# Patient Record
Sex: MALE | Race: WHITE | NOT HISPANIC OR LATINO | Employment: FULL TIME | URBAN - METROPOLITAN AREA
[De-identification: names, ages, dates, MRNs, and addresses within clinical notes are randomized per-mention and may not be internally consistent; named-entity substitution may affect disease eponyms.]

---

## 2024-10-04 ENCOUNTER — OFFICE VISIT (OUTPATIENT)
Dept: INTERNAL MEDICINE | Age: 69
End: 2024-10-04
Payer: COMMERCIAL

## 2024-10-04 VITALS
OXYGEN SATURATION: 94 % | WEIGHT: 195 LBS | DIASTOLIC BLOOD PRESSURE: 70 MMHG | TEMPERATURE: 97.5 F | HEIGHT: 72 IN | HEART RATE: 90 BPM | BODY MASS INDEX: 26.41 KG/M2 | SYSTOLIC BLOOD PRESSURE: 136 MMHG

## 2024-10-04 DIAGNOSIS — Z11.59 NEED FOR HEPATITIS C SCREENING TEST: ICD-10-CM

## 2024-10-04 DIAGNOSIS — Z12.5 SCREENING FOR MALIGNANT NEOPLASM OF PROSTATE: ICD-10-CM

## 2024-10-04 DIAGNOSIS — E78.5 HYPERLIPIDEMIA, UNSPECIFIED HYPERLIPIDEMIA TYPE: Primary | Chronic | ICD-10-CM

## 2024-10-04 DIAGNOSIS — Z00.00 ANNUAL PHYSICAL EXAM: ICD-10-CM

## 2024-10-04 DIAGNOSIS — F41.0 PANIC ATTACK: ICD-10-CM

## 2024-10-04 DIAGNOSIS — F41.9 ANXIETY: Chronic | ICD-10-CM

## 2024-10-04 PROBLEM — H93.19 TINNITUS: Status: ACTIVE | Noted: 2024-10-04

## 2024-10-04 RX ORDER — BUSPIRONE HYDROCHLORIDE 10 MG/1
10 TABLET ORAL 2 TIMES DAILY
Qty: 90 TABLET | Refills: 1 | Status: SHIPPED | OUTPATIENT
Start: 2024-10-04

## 2024-10-04 RX ORDER — ATORVASTATIN CALCIUM 20 MG/1
20 TABLET, FILM COATED ORAL DAILY
COMMUNITY
End: 2024-10-04 | Stop reason: SDUPTHER

## 2024-10-04 RX ORDER — ATORVASTATIN CALCIUM 20 MG/1
20 TABLET, FILM COATED ORAL DAILY
Qty: 90 TABLET | Refills: 1 | Status: SHIPPED | OUTPATIENT
Start: 2024-10-04

## 2024-10-04 RX ORDER — BUSPIRONE HYDROCHLORIDE 10 MG/1
10 TABLET ORAL 2 TIMES DAILY
COMMUNITY
Start: 2024-07-15 | End: 2024-10-04 | Stop reason: SDUPTHER

## 2024-10-04 NOTE — PROGRESS NOTES
Chief Complaint  Establish Care (The patient is coming in to [facility]. The patient is needing medication refills. )    Subjective      History of Present Illness  The patient is a 69-year-old male who presents to [facility].    He was previously under the care of Keeley Remy, but due to her office closure, he has been without his statin medication for hyperlipidemia for about a week. His cholesterol levels have been well-managed with statin medication. His last blood work, conducted six months ago, showed normal results except for a low protein level. As a vegetarian, he has since incorporated a daily protein drink into his diet. He also had slightly elevated triglycerides and was advised to increase his hydration, which he has been doing. He manages his high cholesterol with regular blood work every six months, now annually, and has been advised to maintain a high-protein diet and stay hydrated.    He has a history of anxiety and mild panic attacks, for which he takes buspirone 10 mg once daily, increasing to twice daily during more anxious periods.    He underwent a physical examination and colonoscopy in 2024, which revealed one polyp, leading to a recommendation for another colonoscopy in five years. His bone density scan was normal. His PSA test was also normal.    He has been referred to a dermatologist for a dry patch on his face, which has not changed over time. He has no history of skin cancer but has an elevated mole on his back.    He has tinnitus, mostly in one ear, and mild hearing loss, for which he has seen an ENT specialist. He also has geographic tongue.    He has received two rounds of shingles vaccines and several COVID-19 vaccines, but not the latest one. He believes he may have received the pneumonia vaccine and is unsure about the tetanus vaccine.    He has no history of heart problems or shortness of breath on exertion. He enjoys playing tennis and does not smoke.    FAMILY  "HISTORY  Two of his brothers have had heart attacks, one of them has had a stroke. His father had a stroke at maybe late 30s. His mother was 85, she did not have any problem with strokes, but she  of a massive hemorrhage. His brother's stroke was different from the kind his father had. His father had the worst kind of stroke. He had to actually retire from it. His father was a long-term smoker and he got emphysema.       Past Medical History:   Diagnosis Date    Allergic     Pollen    Anxiety     Geographic tongue 06/15/2022    Headache     HL (hearing loss)     Tinnitus    Hyperlipidemia     On meds        History reviewed. No pertinent surgical history.     Social History     Tobacco Use   Smoking Status Never    Passive exposure: Never   Smokeless Tobacco Never        Patient Care Team:  Nicky Xavier APRN as PCP - General (Nurse Practitioner)    No Known Allergies       Current Outpatient Medications:     atorvastatin (LIPITOR) 20 MG tablet, Take 1 tablet by mouth Daily., Disp: 90 tablet, Rfl: 1    busPIRone (BUSPAR) 10 MG tablet, Take 1 tablet by mouth 2 (Two) Times a Day., Disp: 90 tablet, Rfl: 1    Objective     Vitals:    10/04/24 1120   BP: 136/70   BP Location: Right arm   Patient Position: Sitting   Cuff Size: Adult   Pulse: 90   Temp: 97.5 °F (36.4 °C)   TempSrc: Temporal   SpO2: 94%   Weight: 88.5 kg (195 lb)   Height: 182.9 cm (72\")        Wt Readings from Last 3 Encounters:   10/04/24 88.5 kg (195 lb)        BP Readings from Last 3 Encounters:   10/04/24 136/70          Physical Exam  Vitals reviewed.   Constitutional:       General: He is not in acute distress.  HENT:      Head: Normocephalic and atraumatic.   Cardiovascular:      Rate and Rhythm: Normal rate and regular rhythm.   Pulmonary:      Effort: Pulmonary effort is normal.      Breath sounds: Normal breath sounds. No wheezing, rhonchi or rales.   Musculoskeletal:      Right lower leg: No edema.      Left lower leg: No " edema.   Skin:     General: Skin is warm and dry.   Neurological:      General: No focal deficit present.      Mental Status: He is alert.   Psychiatric:         Thought Content: Thought content normal.                Result Review   The following data was reviewed by: CARLOTTA Hebert on 10/04/2024:  []  Tests & Results  []  Hospitalization/Emergency Department/Urgent Care  [x]  Internal/External Consultant Notes       Assessment and Plan     Diagnoses and all orders for this visit:    1. Hyperlipidemia, unspecified hyperlipidemia type (Primary)    2. Anxiety    3. Panic attack    4. Need for hepatitis C screening test  -     Hepatitis C Antibody; Future    5. Screening for malignant neoplasm of prostate  -     PSA Screen; Future    6. Annual physical exam  -     CBC & Differential; Future  -     Comprehensive Metabolic Panel; Future  -     Lipid Panel; Future  -     TSH Rfx On Abnormal To Free T4; Future    Other orders  -     atorvastatin (LIPITOR) 20 MG tablet; Take 1 tablet by mouth Daily.  Dispense: 90 tablet; Refill: 1  -     busPIRone (BUSPAR) 10 MG tablet; Take 1 tablet by mouth 2 (Two) Times a Day.  Dispense: 90 tablet; Refill: 1         Assessment & Plan  1. Hyperlipidemia.  He has been taking atorvastatin for cholesterol management. The last pill was taken on Monday night, and a refill has been sent to [facility]. He reports that his cholesterol levels have been fine since starting statins. Blood work, including cholesterol levels, has been ordered for 6 months from now.    2. Anxiety.  He takes buspirone 10 mg once a day for anxiety and mild panic attacks, with an additional dose if experiencing a panicky day. A refill has been sent to [facility] pharmacy.    3. Tinnitus.  He reports having tinnitus, which is controlled and does not bother him anymore. He also has mild hearing loss but does not require a hearing aid as per his ENT specialist.    4. Geographic Tongue.  He reports having a  geographic tongue, but no specific treatment is required.    5. Skin Lesion.  He has a dry area on his face, which may be precancerous. He has been referred to a dermatologist, but the appointment is backlogged for a few months.    6. Health Maintenance.  He had a physical and a colonoscopy in 2024, with one polyp found, recommending another colonoscopy in 5 years. He has had the Shingrix vaccine and multiple rounds of the COVID-19 vaccine but not the latest one. He is advised to get a flu shot and consider a pneumococcal 20 vaccine. A PSA blood test has been ordered for next year.    Follow-up  Return in 6 months for a physical examination.       BMI is >= 25 and <30. (Overweight) The following options were offered after discussion;: exercise counseling/recommendations and nutrition counseling/recommendations       Patient was given instructions and counseling regarding his condition or for health maintenance advice. Please see specific information pulled into the AVS if appropriate.     Follow Up   Return in about 6 months (around 4/4/2025) for Annual physical.    Patient or patient representative verbalized consent for the use of Ambient Listening during the visit with  CARLOTTA Hebert for chart documentation. 10/4/2024  12:05 EDT    CARLOTTA Hebert

## 2025-02-18 ENCOUNTER — OFFICE VISIT (OUTPATIENT)
Dept: INTERNAL MEDICINE | Age: 70
End: 2025-02-18
Payer: COMMERCIAL

## 2025-02-18 VITALS
WEIGHT: 204.2 LBS | HEART RATE: 96 BPM | RESPIRATION RATE: 16 BRPM | BODY MASS INDEX: 27.66 KG/M2 | DIASTOLIC BLOOD PRESSURE: 80 MMHG | TEMPERATURE: 98.6 F | SYSTOLIC BLOOD PRESSURE: 138 MMHG | OXYGEN SATURATION: 95 % | HEIGHT: 72 IN

## 2025-02-18 DIAGNOSIS — H61.22 IMPACTED CERUMEN OF LEFT EAR: Primary | ICD-10-CM

## 2025-02-18 DIAGNOSIS — H66.92 LEFT OTITIS MEDIA, UNSPECIFIED OTITIS MEDIA TYPE: ICD-10-CM

## 2025-02-18 PROCEDURE — 99213 OFFICE O/P EST LOW 20 MIN: CPT | Performed by: NURSE PRACTITIONER

## 2025-02-18 PROCEDURE — 69210 REMOVE IMPACTED EAR WAX UNI: CPT | Performed by: NURSE PRACTITIONER

## 2025-02-18 RX ORDER — ACETIC ACID 20.65 MG/ML
3 SOLUTION AURICULAR (OTIC) 3 TIMES DAILY
Qty: 15 ML | Refills: 0 | Status: SHIPPED | OUTPATIENT
Start: 2025-02-18 | End: 2025-02-25

## 2025-02-18 NOTE — PROGRESS NOTES
Chief Complaint  Ear Fullness (Was diagnosed with an ear infection back in December. Took a steroid, Abx and eardrops. Since then, better but the fullness has not completely gone away. Used Debrox recently. )    Subjective      History of Present Illness  The patient is a 69-year-old male who presents for evaluation of left ear clogging.    He reports persistent clogging in his left ear, which he attributes to an ear infection diagnosed on 12/27/2024. At that time, he experienced irritation, sleep disturbances, and otorrhea. He was treated with antibiotics and steroid eardrops, which initially alleviated his symptoms. However, during cerumen removal, his inner ear was inadvertently lacerated, resulting in bleeding. The healthcare provider did not believe the eardrum was affected. He has been using Debrox for cerumen management. On Sunday, he noticed a temporary improvement in his hearing, but by Monday, the clogging sensation returned. A similar episode occurred 3 years ago, leading to hearing loss in the same ear, which was resolved after ear irrigation at an urgent care facility. He reports no current ear irritation or pain but notes a sensation of fullness and reduced hearing. He also reports no sore throat or cough. He occasionally uses Q-tips for ear cleaning. He recalls experiencing intermittent ear clicking a few weeks ago, which has since subsided. He also has tinnitus, but it does not bother him anymore.    MEDICATIONS  Current: Debrox    IMMUNIZATIONS  He received the influenza vaccine.       Past Medical History:   Diagnosis Date    Allergic 1990    Pollen    Anxiety 2022    Geographic tongue 06/15/2022    Headache 1962    HL (hearing loss)     Tinnitus    Hyperlipidemia 2021    On meds        History reviewed. No pertinent surgical history.     Social History     Tobacco Use   Smoking Status Never    Passive exposure: Never   Smokeless Tobacco Never        Patient Care Team:  Nicky Xavier APRN as  "PCP - General (Nurse Practitioner)    Allergies   Allergen Reactions    Sulfa Antibiotics GI Intolerance     Only happened once.           Current Outpatient Medications:     atorvastatin (LIPITOR) 20 MG tablet, Take 1 tablet by mouth Daily., Disp: 90 tablet, Rfl: 1    busPIRone (BUSPAR) 10 MG tablet, Take 1 tablet by mouth 2 (Two) Times a Day., Disp: 90 tablet, Rfl: 1    acetic acid (VOSOL) 2 % otic solution, Administer 3 drops into the left ear 3 (Three) Times a Day for 7 days., Disp: 15 mL, Rfl: 0    Objective     Vitals:    02/18/25 1148   BP: 138/80   BP Location: Left arm   Patient Position: Sitting   Cuff Size: Large Adult   Pulse: 96   Resp: 16   Temp: 98.6 °F (37 °C)   TempSrc: Temporal   SpO2: 95%   Weight: 92.6 kg (204 lb 3.2 oz)   Height: 182.9 cm (72\")        Wt Readings from Last 3 Encounters:   02/18/25 92.6 kg (204 lb 3.2 oz)   10/04/24 88.5 kg (195 lb)        BP Readings from Last 3 Encounters:   02/18/25 138/80   10/04/24 136/70          Physical Exam  Vitals reviewed.   Constitutional:       General: He is not in acute distress.  HENT:      Head: Normocephalic and atraumatic.      Right Ear: Tympanic membrane and ear canal normal.      Left Ear: There is impacted cerumen.   Pulmonary:      Effort: Pulmonary effort is normal.   Neurological:      General: No focal deficit present.      Mental Status: He is alert.   Psychiatric:         Thought Content: Thought content normal.           Ear Cerumen Removal    Date/Time: 2/18/2025 12:06 PM    Performed by: Nicky Xavier APRN  Authorized by: Nicky Xavier APRN  Location details: left ear and right ear  Patient tolerance: patient tolerated the procedure well with no immediate complications  Comments: Large amounts of cerumen removed from both ears.  Left ear impaction removed.  Left ear TM with erythema and white exudate and canal with erythema.  Patient reports no pain, improved hearing.  Procedure type: instrumentation, irrigation "         Result Review   The following data was reviewed by: CARLOTTA Hebert on 02/18/2025:  []  Tests & Results  [x]  Hospitalization/Emergency Department/Urgent Care  []  Internal/External Consultant Notes       Assessment and Plan     Diagnoses and all orders for this visit:    1. Impacted cerumen of left ear (Primary)  -     Ear Cerumen Removal    2. Left otitis media, unspecified otitis media type    Other orders  -     acetic acid (VOSOL) 2 % otic solution; Administer 3 drops into the left ear 3 (Three) Times a Day for 7 days.  Dispense: 15 mL; Refill: 0         Assessment & Plan  1. Cerumen impaction, left ear.  The patient's symptoms of ear fullness and reduced hearing are likely due to cerumen impaction, which is observed to be lodged against the tympanic membrane. The ear canal appears erythematous, possibly due to recent manipulation, but there is no associated pain. He has been advised against the use of Q-tips for cerumen removal. Aural irrigation will be performed today to remove the impacted cerumen.    2.  Left otitis media.  Post cerumen irrigation of left ear reveals erythematous TM with white exudate and erythematous canal.  Patient denies pain.  Treat with acetic acid 2% otic solution 3 drops per left ear 3 times a day leaving ear upright for at least 5 minutes.  Use eardrops for 7 days.  Return in 2 weeks for reexamination.  Patient reports hearing improved post irrigation.    Patient was given instructions and counseling regarding his condition or for health maintenance advice. Please see specific information pulled into the AVS if appropriate.     Follow Up   Return in 2 weeks (on 3/4/2025) for Next scheduled follow up, or if symptoms worsen or fail to improve.    Patient or patient representative verbalized consent for the use of Ambient Listening during the visit with  CARLOTTA Hebert for chart documentation. 2/18/2025  13:34 EST    CARLOTTA Hebert

## 2025-03-04 ENCOUNTER — OFFICE VISIT (OUTPATIENT)
Dept: INTERNAL MEDICINE | Age: 70
End: 2025-03-04
Payer: COMMERCIAL

## 2025-03-04 VITALS
WEIGHT: 200.8 LBS | HEIGHT: 72 IN | DIASTOLIC BLOOD PRESSURE: 80 MMHG | OXYGEN SATURATION: 98 % | HEART RATE: 78 BPM | TEMPERATURE: 98.6 F | SYSTOLIC BLOOD PRESSURE: 115 MMHG | BODY MASS INDEX: 27.2 KG/M2

## 2025-03-04 DIAGNOSIS — H73.92 TYMPANIC MEMBRANE DISORDER, LEFT: Primary | ICD-10-CM

## 2025-03-04 PROCEDURE — 99213 OFFICE O/P EST LOW 20 MIN: CPT | Performed by: NURSE PRACTITIONER

## 2025-03-04 RX ORDER — ACETIC ACID 20.65 MG/ML
SOLUTION AURICULAR (OTIC)
COMMUNITY
Start: 2025-02-18

## 2025-03-04 NOTE — PROGRESS NOTES
Chief Complaint  Earache (The patient is coming in for a 3 week follow up on left ear pain. The patient states his left ear feel fine. )    Subjective      History of Present Illness  The patient is a 69-year-old male who presents for evaluation of his left ear.    He reports no current issues with his right ear but expresses concern about his left ear, which he perceives as abnormal. He is not experiencing any pain in the left ear. He has been using prescribed eardrops as part of his treatment regimen. He recalls a previous visit to an ENT specialist in [place] a few years ago when he was initially diagnosed with tinnitus. During that consultation, he was informed of mild hearing loss. However, he notes that his hearing in the left ear has since returned to its baseline level, comparable to his right ear.       Past Medical History:   Diagnosis Date    Allergic 1990    Pollen    Anxiety 2022    Geographic tongue 06/15/2022    Headache 1962    HL (hearing loss)     Tinnitus    Hyperlipidemia 2021    On meds        History reviewed. No pertinent surgical history.     Social History     Tobacco Use   Smoking Status Never    Passive exposure: Never   Smokeless Tobacco Never        Patient Care Team:  Nicky Xavier APRN as PCP - General (Nurse Practitioner)    Allergies   Allergen Reactions    Sulfa Antibiotics GI Intolerance     Only happened once.           Current Outpatient Medications:     acetic acid (VOSOL) 2 % otic solution, Administer  into the left ear., Disp: , Rfl:     atorvastatin (LIPITOR) 20 MG tablet, Take 1 tablet by mouth Daily., Disp: 90 tablet, Rfl: 1    busPIRone (BUSPAR) 10 MG tablet, Take 1 tablet by mouth 2 (Two) Times a Day., Disp: 90 tablet, Rfl: 1    Objective     Vitals:    03/04/25 1313   BP: 115/80   BP Location: Left arm   Patient Position: Sitting   Cuff Size: Large Adult   Pulse: 78   Temp: 98.6 °F (37 °C)   TempSrc: Temporal   SpO2: 98%   Weight: 91.1 kg (200 lb 12.8 oz)  "  Height: 182.9 cm (72\")        Wt Readings from Last 3 Encounters:   03/04/25 91.1 kg (200 lb 12.8 oz)   02/18/25 92.6 kg (204 lb 3.2 oz)   10/04/24 88.5 kg (195 lb)        BP Readings from Last 3 Encounters:   03/04/25 115/80   02/18/25 138/80   10/04/24 136/70          Physical Exam  Vitals reviewed.   Constitutional:       General: He is not in acute distress.  HENT:      Right Ear: Tympanic membrane and ear canal normal.      Left Ear: Ear canal normal.      Ears:      Comments: Left TM appears abnormal with good light reflex.  Pulmonary:      Effort: Pulmonary effort is normal.   Neurological:      General: No focal deficit present.      Mental Status: He is alert.   Psychiatric:         Thought Content: Thought content normal.                Result Review   The following data was reviewed by: CARLOTTA Hebert on 03/04/2025:  [x]  Tests & Results  []  Hospitalization/Emergency Department/Urgent Care  []  Internal/External Consultant Notes       Assessment and Plan     Diagnoses and all orders for this visit:    1. Tympanic membrane disorder, left (Primary)         Assessment & Plan  1. Abnormality in the left ear.  The patient's left ear does not exhibit signs of infection or redness, but there is an abnormal appearance that could be due to wax or an irregularity in the eardrum. He reports no pain and has been using eardrops as previously prescribed. Despite the use of eardrops and a previous irrigation, the abnormality persists. A referral to an ENT specialist is recommended for further evaluation and potential treatment, as they have more advanced equipment to assess and possibly remove the abnormality.     Patient was given instructions and counseling regarding his condition or for health maintenance advice. Please see specific information pulled into the AVS if appropriate.     Follow Up   Return for Next scheduled follow up.    Patient or patient representative verbalized consent for the use of " Ambient Listening during the visit with  CARLOTTA Hebert for chart documentation. 3/4/2025  13:28 EST    CARLOTTA Hebert

## 2025-03-31 RX ORDER — ATORVASTATIN CALCIUM 20 MG/1
20 TABLET, FILM COATED ORAL DAILY
Qty: 90 TABLET | Refills: 1 | Status: SHIPPED | OUTPATIENT
Start: 2025-03-31 | End: 2025-04-03 | Stop reason: SDUPTHER

## 2025-04-03 RX ORDER — ATORVASTATIN CALCIUM 20 MG/1
20 TABLET, FILM COATED ORAL DAILY
Qty: 90 TABLET | Refills: 1 | Status: SHIPPED | OUTPATIENT
Start: 2025-04-03

## 2025-04-07 NOTE — PROGRESS NOTES
Chief Complaint  Annual Exam (69 year old male here today for his annual physical. Needs to complete labs)    Subjective      History of Present Illness  The patient is a 69-year-old male who presents for an annual wellness exam and follow-up of hyperlipidemia and anxiety.    He has not undergone any recent blood work but has been fasting since 2 AM in anticipation of today's visit. He is uncertain about his pneumococcal vaccination status and will verify with [facility]. He has consented to receive a tetanus vaccine during this visit. He reports no pain. He has consulted an otolaryngologist who found no abnormalities. He experienced a minor episode of greenish discharge from his ear, which was attributed to cerumen impaction. This is the second instance of significant cerumen accumulation in his ear over the past 3 years, with the first episode being associated with an ear infection. He also reports tinnitus and geographic tongue, both of which are relatively harmless. He assumes tinnitus is age-related. He has gotten used to geographic tongue.    He continues his regimen of atorvastatin 20 mg daily for cholesterol management.    He is currently on buspirone 10 mg once daily for anxiety, a dosage that was initially recommended by his previous nurse practitioner. He requires a refill of this medication as he only has approximately 10 tablets remaining.    He recently visited a dermatologist who identified a red spot on his back, which was diagnosed as basal cell carcinoma. The lesion was treated with cryotherapy, and he is scheduled for a follow-up visit in approximately one month. He also had a mole and another unspecified skin condition evaluated, both of which were deemed non-problematic.    MEDICATIONS  atorvastatin, buspirone    IMMUNIZATIONS  He is uncertain about his pneumococcal vaccination status and will verify with [employer].       Past Medical History:   Diagnosis Date    Allergic 1990    Pollen    Anxiety 2022  "   Basal cell carcinoma (BCC) 04/08/2025    Geographic tongue 06/15/2022    Headache 1962    HL (hearing loss)     Tinnitus    Hyperlipidemia 2021    On meds        History reviewed. No pertinent surgical history.     Social History     Tobacco Use   Smoking Status Never    Passive exposure: Never   Smokeless Tobacco Never        Patient Care Team:  Nicky Xavier APRN as PCP - General (Nurse Practitioner)    Allergies   Allergen Reactions    Sulfa Antibiotics GI Intolerance     Only happened once.           Current Outpatient Medications:     atorvastatin (LIPITOR) 20 MG tablet, Take 1 tablet by mouth Daily., Disp: 90 tablet, Rfl: 1    busPIRone (BUSPAR) 10 MG tablet, Take 1 tablet by mouth Daily., Disp: 90 tablet, Rfl: 3    Objective     Vitals:    04/08/25 1306   BP: 148/76   BP Location: Left arm   Patient Position: Sitting   Cuff Size: Large Adult   Pulse: 81   Resp: 18   Temp: 98.6 °F (37 °C)   TempSrc: Temporal   SpO2: 94%   Weight: 90.6 kg (199 lb 12.8 oz)   Height: 182.9 cm (72.01\")        Wt Readings from Last 3 Encounters:   04/08/25 90.6 kg (199 lb 12.8 oz)   03/04/25 91.1 kg (200 lb 12.8 oz)   02/18/25 92.6 kg (204 lb 3.2 oz)        BP Readings from Last 3 Encounters:   04/08/25 148/76   03/04/25 115/80   02/18/25 138/80          Physical Exam  Vitals reviewed.   Constitutional:       General: He is not in acute distress.  HENT:      Head: Normocephalic and atraumatic.      Right Ear: Tympanic membrane and ear canal normal.      Left Ear: Tympanic membrane and ear canal normal.   Eyes:      Conjunctiva/sclera: Conjunctivae normal.   Cardiovascular:      Rate and Rhythm: Normal rate and regular rhythm.      Heart sounds: Normal heart sounds.   Pulmonary:      Effort: Pulmonary effort is normal.      Breath sounds: Normal breath sounds. No wheezing, rhonchi or rales.   Abdominal:      General: There is no distension.      Palpations: Abdomen is soft. There is no mass.      Tenderness: There is no " abdominal tenderness.   Musculoskeletal:      Right lower leg: No edema.      Left lower leg: No edema.   Lymphadenopathy:      Cervical: No cervical adenopathy.   Skin:     General: Skin is warm and dry.   Neurological:      General: No focal deficit present.      Mental Status: He is alert.   Psychiatric:         Mood and Affect: Mood normal.         Thought Content: Thought content normal.                Result Review   The following data was reviewed by: CARLOTTA Hebert on 04/04/2025:  [x]  Tests & Results  []  Hospitalization/Emergency Department/Urgent Care  []  Internal/External Consultant Notes       Assessment and Plan     Diagnoses and all orders for this visit:    1. Annual physical exam (Primary)  -     TSH Rfx On Abnormal To Free T4  -     Lipid Panel  -     Comprehensive Metabolic Panel  -     CBC & Differential    2. Hyperlipidemia, unspecified hyperlipidemia type    3. Anxiety    4. Basal cell carcinoma (BCC), unspecified site    5. Need for Tdap vaccination  -     Tdap Vaccine Greater Than or Equal To 8yo IM    6. Need for hepatitis C screening test  -     Hepatitis C Antibody    7. Screening for malignant neoplasm of prostate  -     PSA Screen    Other orders  -     busPIRone (BUSPAR) 10 MG tablet; Take 1 tablet by mouth Daily.  Dispense: 90 tablet; Refill: 3           Assessment & Plan  1. Health maintenance.  He is uncertain about his pneumococcal vaccination status and will verify with [employer]. He has consented to receive a tetanus vaccine during this visit. He reports tinnitus and geographic tongue, both of which are relatively harmless. He assumes tinnitus is age-related. He has gotten used to geographic tongue. A tetanus vaccine will be administered today. He is advised to receive the pneumonia vaccine concurrently with the influenza vaccine in the fall.    2. Hyperlipidemia.  He continues his regimen of atorvastatin 20 mg daily for cholesterol management. A cholesterol panel will be  ordered to monitor his lipid levels.    3. Anxiety.  He is currently on buspirone 10 mg once daily for anxiety, a dosage that was initially recommended by his previous nurse practitioner. A prescription for buspirone 10 mg, sufficient for a 3-month supply, will be provided and sent to [facility].    4. Basal cell carcinoma.  He recently visited a dermatologist who identified a red spot on his back, which was diagnosed as basal cell carcinoma. The lesion was treated with cryotherapy, and he is scheduled for a follow-up visit in approximately one month.    PROCEDURE  The basal cell carcinoma lesion on the patient's back was treated with cryotherapy.      Patient was given instructions and counseling regarding his condition or for health maintenance advice. Please see specific information pulled into the AVS if appropriate.     Follow Up   Return in about 1 year (around 4/8/2026) for Annual physical.    Patient or patient representative verbalized consent for the use of Ambient Listening during the visit with  CARLOTTA Hebert for chart documentation. 4/8/2025  13:17 EDT    CARLOTTA Hebert

## 2025-04-08 ENCOUNTER — OFFICE VISIT (OUTPATIENT)
Dept: INTERNAL MEDICINE | Age: 70
End: 2025-04-08
Payer: COMMERCIAL

## 2025-04-08 VITALS
SYSTOLIC BLOOD PRESSURE: 148 MMHG | OXYGEN SATURATION: 94 % | WEIGHT: 199.8 LBS | HEART RATE: 81 BPM | HEIGHT: 72 IN | RESPIRATION RATE: 18 BRPM | TEMPERATURE: 98.6 F | DIASTOLIC BLOOD PRESSURE: 76 MMHG | BODY MASS INDEX: 27.06 KG/M2

## 2025-04-08 DIAGNOSIS — F41.9 ANXIETY: ICD-10-CM

## 2025-04-08 DIAGNOSIS — Z23 NEED FOR TDAP VACCINATION: ICD-10-CM

## 2025-04-08 DIAGNOSIS — E78.5 HYPERLIPIDEMIA, UNSPECIFIED HYPERLIPIDEMIA TYPE: ICD-10-CM

## 2025-04-08 DIAGNOSIS — Z00.00 ANNUAL PHYSICAL EXAM: Primary | ICD-10-CM

## 2025-04-08 DIAGNOSIS — C44.91 BASAL CELL CARCINOMA (BCC), UNSPECIFIED SITE: ICD-10-CM

## 2025-04-08 DIAGNOSIS — Z11.59 NEED FOR HEPATITIS C SCREENING TEST: ICD-10-CM

## 2025-04-08 DIAGNOSIS — Z12.5 SCREENING FOR MALIGNANT NEOPLASM OF PROSTATE: ICD-10-CM

## 2025-04-08 LAB
ALBUMIN SERPL-MCNC: 4.8 G/DL (ref 3.5–5.2)
ALBUMIN/GLOB SERPL: 1.7 G/DL
ALP SERPL-CCNC: 120 U/L (ref 39–117)
ALT SERPL W P-5'-P-CCNC: 24 U/L (ref 1–41)
ANION GAP SERPL CALCULATED.3IONS-SCNC: 11.3 MMOL/L (ref 5–15)
AST SERPL-CCNC: 21 U/L (ref 1–40)
BASOPHILS # BLD AUTO: 0.08 10*3/MM3 (ref 0–0.2)
BASOPHILS NFR BLD AUTO: 1.3 % (ref 0–1.5)
BILIRUB SERPL-MCNC: 1 MG/DL (ref 0–1.2)
BUN SERPL-MCNC: 14 MG/DL (ref 8–23)
BUN/CREAT SERPL: 16.9 (ref 7–25)
CALCIUM SPEC-SCNC: 10 MG/DL (ref 8.6–10.5)
CHLORIDE SERPL-SCNC: 105 MMOL/L (ref 98–107)
CHOLEST SERPL-MCNC: 177 MG/DL (ref 0–200)
CO2 SERPL-SCNC: 22.7 MMOL/L (ref 22–29)
CREAT SERPL-MCNC: 0.83 MG/DL (ref 0.76–1.27)
DEPRECATED RDW RBC AUTO: 42.4 FL (ref 37–54)
EGFRCR SERPLBLD CKD-EPI 2021: 94.2 ML/MIN/1.73
EOSINOPHIL # BLD AUTO: 0.1 10*3/MM3 (ref 0–0.4)
EOSINOPHIL NFR BLD AUTO: 1.6 % (ref 0.3–6.2)
ERYTHROCYTE [DISTWIDTH] IN BLOOD BY AUTOMATED COUNT: 13.1 % (ref 12.3–15.4)
GLOBULIN UR ELPH-MCNC: 2.9 GM/DL
GLUCOSE SERPL-MCNC: 96 MG/DL (ref 65–99)
HCT VFR BLD AUTO: 48.9 % (ref 37.5–51)
HCV AB SER QL: NORMAL
HDLC SERPL-MCNC: 45 MG/DL (ref 40–60)
HGB BLD-MCNC: 16.1 G/DL (ref 13–17.7)
IMM GRANULOCYTES # BLD AUTO: 0.02 10*3/MM3 (ref 0–0.05)
IMM GRANULOCYTES NFR BLD AUTO: 0.3 % (ref 0–0.5)
LDLC SERPL CALC-MCNC: 111 MG/DL (ref 0–100)
LDLC/HDLC SERPL: 2.42 {RATIO}
LYMPHOCYTES # BLD AUTO: 1.32 10*3/MM3 (ref 0.7–3.1)
LYMPHOCYTES NFR BLD AUTO: 21.4 % (ref 19.6–45.3)
MCH RBC QN AUTO: 29.5 PG (ref 26.6–33)
MCHC RBC AUTO-ENTMCNC: 32.9 G/DL (ref 31.5–35.7)
MCV RBC AUTO: 89.7 FL (ref 79–97)
MONOCYTES # BLD AUTO: 0.51 10*3/MM3 (ref 0.1–0.9)
MONOCYTES NFR BLD AUTO: 8.3 % (ref 5–12)
NEUTROPHILS NFR BLD AUTO: 4.13 10*3/MM3 (ref 1.7–7)
NEUTROPHILS NFR BLD AUTO: 67.1 % (ref 42.7–76)
NRBC BLD AUTO-RTO: 0 /100 WBC (ref 0–0.2)
PLATELET # BLD AUTO: 327 10*3/MM3 (ref 140–450)
PMV BLD AUTO: 9.3 FL (ref 6–12)
POTASSIUM SERPL-SCNC: 4.4 MMOL/L (ref 3.5–5.2)
PROT SERPL-MCNC: 7.7 G/DL (ref 6–8.5)
PSA SERPL-MCNC: 1.48 NG/ML (ref 0–4)
RBC # BLD AUTO: 5.45 10*6/MM3 (ref 4.14–5.8)
SODIUM SERPL-SCNC: 139 MMOL/L (ref 136–145)
TRIGL SERPL-MCNC: 115 MG/DL (ref 0–150)
TSH SERPL DL<=0.05 MIU/L-ACNC: 3.26 UIU/ML (ref 0.27–4.2)
VLDLC SERPL-MCNC: 21 MG/DL (ref 5–40)
WBC NRBC COR # BLD AUTO: 6.16 10*3/MM3 (ref 3.4–10.8)

## 2025-04-08 PROCEDURE — G0103 PSA SCREENING: HCPCS | Performed by: NURSE PRACTITIONER

## 2025-04-08 PROCEDURE — 80061 LIPID PANEL: CPT | Performed by: NURSE PRACTITIONER

## 2025-04-08 PROCEDURE — 80050 GENERAL HEALTH PANEL: CPT | Performed by: NURSE PRACTITIONER

## 2025-04-08 PROCEDURE — 86803 HEPATITIS C AB TEST: CPT | Performed by: NURSE PRACTITIONER

## 2025-04-08 RX ORDER — BUSPIRONE HYDROCHLORIDE 10 MG/1
10 TABLET ORAL DAILY
Qty: 90 TABLET | Refills: 3 | Status: SHIPPED | OUTPATIENT
Start: 2025-04-08